# Patient Record
Sex: FEMALE | Race: WHITE | NOT HISPANIC OR LATINO | URBAN - NONMETROPOLITAN AREA
[De-identification: names, ages, dates, MRNs, and addresses within clinical notes are randomized per-mention and may not be internally consistent; named-entity substitution may affect disease eponyms.]

---

## 2019-04-10 ENCOUNTER — IMPORTED ENCOUNTER (OUTPATIENT)
Dept: URBAN - NONMETROPOLITAN AREA CLINIC 1 | Facility: CLINIC | Age: 59
End: 2019-04-10

## 2019-04-10 PROBLEM — H40.013: Noted: 2019-04-10

## 2019-04-10 PROBLEM — H52.4: Noted: 2019-04-10

## 2019-04-10 PROBLEM — H52.03: Noted: 2019-04-10

## 2019-04-10 PROBLEM — H52.223: Noted: 2019-04-10

## 2019-04-10 PROCEDURE — 99213 OFFICE O/P EST LOW 20 MIN: CPT

## 2019-04-10 NOTE — PATIENT DISCUSSION
glc suspectfamily hx stable todaymonitorHyperopia-Discussed diagnosis with patient. Presbyopia-Discussed diagnosis with patient. Updated CL Rx given. Updated spec Rx given. Continue current CL Rx. Rx given. MONOVISIONNEW TRIALS GIVEN OD +0.50  OS +3. 00PT TO TRY THEN  CALL

## 2019-08-14 ENCOUNTER — IMPORTED ENCOUNTER (OUTPATIENT)
Dept: URBAN - NONMETROPOLITAN AREA CLINIC 1 | Facility: CLINIC | Age: 59
End: 2019-08-14

## 2019-08-14 PROBLEM — H52.223: Noted: 2019-08-14

## 2019-08-14 PROBLEM — H40.013: Noted: 2019-08-14

## 2019-08-14 PROBLEM — H52.4: Noted: 2019-08-14

## 2019-08-14 PROBLEM — H25.813: Noted: 2019-08-14

## 2019-08-14 PROBLEM — H52.03: Noted: 2019-08-14

## 2019-08-14 PROCEDURE — 92310 CONTACT LENS FITTING OU: CPT

## 2019-08-14 PROCEDURE — 92012 INTRM OPH EXAM EST PATIENT: CPT

## 2019-08-14 PROCEDURE — 92015 DETERMINE REFRACTIVE STATE: CPT

## 2019-08-14 NOTE — PATIENT DISCUSSION
Hyperopia-Discussed diagnosis with patient. Astigmatism-Discussed diagnosis with patient. Presbyopia-Discussed diagnosis with patient. Updated spec Rx given. Recommend lens that will provide comfort as well as protect safety and health of eyes. CL wear-CLs fit and center well.-Stressed that patient should not sleep in CL. -Trials given +0.50 OD +3.00 OS pt to let us know which lenses she likes best -CL care and precautions given. Cataract OU-Reviewed symptoms of advancing cataract growth such as glare and halos and decreased vision.-Continue to monitor for now. Pt will notify us if any new symptoms develop. RTC 1 Yr CEE PRN cat aracelisal; Dr's Notes: Dr. Yash Moreau

## 2022-04-10 ASSESSMENT — TONOMETRY
OD_IOP_MMHG: 17
OS_IOP_MMHG: 16
OD_IOP_MMHG: 15
OS_IOP_MMHG: 17

## 2022-04-10 ASSESSMENT — VISUAL ACUITY
OS_CC: J1
OD_CC: 20/20
OS_SC: 20/20
OS_CC: 20/40
OD_CC: 20/20
OD_CC: J4
OD_CC: 20/30
OD_CC: 20/25
OS_SC: 20/20

## 2022-07-22 PROBLEM — H26.491 POSTERIOR CAPSULAR OPACIFICATION VISUALLY SIGNIFICANT, RIGHT EYE: Status: ACTIVE | Noted: 2022-07-22

## 2022-07-25 PROBLEM — H26.491 POSTERIOR CAPSULAR OPACIFICATION VISUALLY SIGNIFICANT, RIGHT EYE: Status: RESOLVED | Noted: 2022-07-22 | Resolved: 2022-07-25

## 2022-08-19 PROBLEM — H26.492 POSTERIOR CAPSULAR OPACIFICATION VISUALLY SIGNIFICANT, LEFT EYE: Status: ACTIVE | Noted: 2022-08-19

## 2023-02-01 RX ORDER — LIOTHYRONINE SODIUM 5 UG/1
5 TABLET ORAL DAILY
COMMUNITY

## 2023-02-01 RX ORDER — LEVOTHYROXINE SODIUM 0.1 MG/1
100 TABLET ORAL
COMMUNITY

## 2023-02-01 RX ORDER — PROGESTERONE 200 MG/1
200 CAPSULE ORAL DAILY
COMMUNITY